# Patient Record
Sex: FEMALE | Race: WHITE | Employment: FULL TIME | URBAN - METROPOLITAN AREA
[De-identification: names, ages, dates, MRNs, and addresses within clinical notes are randomized per-mention and may not be internally consistent; named-entity substitution may affect disease eponyms.]

---

## 2017-01-26 ENCOUNTER — OFFICE VISIT (OUTPATIENT)
Dept: FAMILY MEDICINE CLINIC | Facility: CLINIC | Age: 52
End: 2017-01-26

## 2017-01-26 VITALS
HEIGHT: 64.2 IN | RESPIRATION RATE: 14 BRPM | HEART RATE: 76 BPM | SYSTOLIC BLOOD PRESSURE: 94 MMHG | DIASTOLIC BLOOD PRESSURE: 58 MMHG | BODY MASS INDEX: 21.11 KG/M2 | WEIGHT: 123.63 LBS

## 2017-01-26 DIAGNOSIS — B37.3 CANDIDAL VULVOVAGINITIS: ICD-10-CM

## 2017-01-26 DIAGNOSIS — N91.2 AMENORRHEA: ICD-10-CM

## 2017-01-26 DIAGNOSIS — J01.90 ACUTE NON-RECURRENT SINUSITIS, UNSPECIFIED LOCATION: Primary | ICD-10-CM

## 2017-01-26 PROCEDURE — 99213 OFFICE O/P EST LOW 20 MIN: CPT | Performed by: FAMILY MEDICINE

## 2017-01-26 RX ORDER — FLUCONAZOLE 150 MG/1
150 TABLET ORAL ONCE
Qty: 2 TABLET | Refills: 0 | Status: SHIPPED | OUTPATIENT
Start: 2017-01-26 | End: 2017-01-26

## 2017-01-26 RX ORDER — AMOXICILLIN AND CLAVULANATE POTASSIUM 875; 125 MG/1; MG/1
1 TABLET, FILM COATED ORAL 2 TIMES DAILY
Qty: 20 TABLET | Refills: 0 | Status: SHIPPED | OUTPATIENT
Start: 2017-01-26 | End: 2017-02-05

## 2017-01-26 NOTE — PROGRESS NOTES
Chief Complaint:  Patient presents with:  Eye Problem: 2-3 weeks left eye continues to water  Sinus Problem: sinus pain, only on left side  Headache  Lab: wouold like orders for labs to check for Menopause      HPI:  This is a 54year old female patient pr Prescriptions:  Amoxicillin-Pot Clavulanate 875-125 MG Oral Tab Take 1 tablet by mouth 2 (two) times daily. Disp: 20 tablet Rfl: 0   fluconazole 150 MG Oral Tab Take 1 tablet (150 mg total) by mouth once.  Repeat in 48 hours if symptoms persist. Disp: 2 tab Candidal vulvovaginitis         Relevant Medications     fluconazole 150 MG Oral Tab     Amenorrhea               Advised the following:  Naz Prince was seen today for eye problem, sinus problem, headache and lab.     Diagnoses and all orders for this visit:    A

## 2017-05-09 ENCOUNTER — OFFICE VISIT (OUTPATIENT)
Dept: FAMILY MEDICINE CLINIC | Facility: CLINIC | Age: 52
End: 2017-05-09

## 2017-05-09 VITALS
BODY MASS INDEX: 21.68 KG/M2 | SYSTOLIC BLOOD PRESSURE: 100 MMHG | TEMPERATURE: 99 F | HEART RATE: 68 BPM | WEIGHT: 127 LBS | HEIGHT: 64 IN | DIASTOLIC BLOOD PRESSURE: 60 MMHG | RESPIRATION RATE: 12 BRPM

## 2017-05-09 DIAGNOSIS — Z13.0 SCREENING FOR ENDOCRINE, NUTRITIONAL, METABOLIC AND IMMUNITY DISORDER: ICD-10-CM

## 2017-05-09 DIAGNOSIS — M25.551 RIGHT HIP PAIN: ICD-10-CM

## 2017-05-09 DIAGNOSIS — Z82.49 FAMILY HISTORY OF HEART DISEASE: ICD-10-CM

## 2017-05-09 DIAGNOSIS — F33.41 RECURRENT MAJOR DEPRESSION IN PARTIAL REMISSION (HCC): ICD-10-CM

## 2017-05-09 DIAGNOSIS — Z13.228 SCREENING FOR ENDOCRINE, NUTRITIONAL, METABOLIC AND IMMUNITY DISORDER: ICD-10-CM

## 2017-05-09 DIAGNOSIS — Z13.21 SCREENING FOR ENDOCRINE, NUTRITIONAL, METABOLIC AND IMMUNITY DISORDER: ICD-10-CM

## 2017-05-09 DIAGNOSIS — Z00.00 ROUTINE GENERAL MEDICAL EXAMINATION AT A HEALTH CARE FACILITY: Primary | ICD-10-CM

## 2017-05-09 DIAGNOSIS — Z13.29 SCREENING FOR ENDOCRINE, NUTRITIONAL, METABOLIC AND IMMUNITY DISORDER: ICD-10-CM

## 2017-05-09 DIAGNOSIS — Z13.220 SCREENING, LIPID: ICD-10-CM

## 2017-05-09 DIAGNOSIS — Z13.1 SCREENING FOR DIABETES MELLITUS: ICD-10-CM

## 2017-05-09 PROCEDURE — 90715 TDAP VACCINE 7 YRS/> IM: CPT | Performed by: NURSE PRACTITIONER

## 2017-05-09 PROCEDURE — 90471 IMMUNIZATION ADMIN: CPT | Performed by: NURSE PRACTITIONER

## 2017-05-09 PROCEDURE — 99396 PREV VISIT EST AGE 40-64: CPT | Performed by: NURSE PRACTITIONER

## 2017-05-09 PROCEDURE — 99213 OFFICE O/P EST LOW 20 MIN: CPT | Performed by: NURSE PRACTITIONER

## 2017-05-09 RX ORDER — FLUOXETINE HYDROCHLORIDE 20 MG/1
20 CAPSULE ORAL DAILY
Qty: 90 CAPSULE | Refills: 3 | Status: SHIPPED | OUTPATIENT
Start: 2017-05-09 | End: 2017-05-09 | Stop reason: CLARIF

## 2017-05-09 RX ORDER — FLUOXETINE HYDROCHLORIDE 20 MG/1
20 CAPSULE ORAL DAILY
Qty: 90 CAPSULE | Refills: 3 | Status: SHIPPED | OUTPATIENT
Start: 2017-05-09 | End: 2017-05-09

## 2017-05-09 NOTE — PROGRESS NOTES
HPI:   Tj Barrera is a 54year old female who presents for a complete physical exam. Last PE over 3 years ago, sees gyne regularly for hx of abnormal pap.  She complains of right hip/upper leg pain for the past 3 weeks with hx of pain at same location o Calcium-Vitamin D (CALTRATE 600 PLUS-VIT D OR) Take  by mouth.  1 daily Disp:  Rfl:       Past Medical History   Diagnosis Date   • OTHER DISEASES      CONSTIPATION   • Acute sinusitis, unspecified    • Acute tonsillitis    • Depression    • Herpes zoste supple, no adenopathy, no bruits, no thyromegaly. BREAST: Bilateral breasts are symmetrical, no masses, no nipple discharge and no axillary lymphadenopathy. LUNGS: clear to auscultation in all fields, good air exchange throughout, breathing non labored. fasting (10-12 hours, water only) labs at [facility].  Supplements recommended: Vitamin D 1000 IU daily, Calcium 500 mg twice a day with food if not part of diet. Self breast exam monthly.   Instruct on regular aerobic exercise 5-7 days per week for 30-45 minut

## 2017-05-09 NOTE — PATIENT INSTRUCTIONS
Immunizations reviewed, recommend: flu vaccine this fall season. Tdap booster given today.   Complete fasting (10-12 hours, water only) labs at [facility].  Supplements recommended: Vitamin D 1000 IU daily, Calcium 500 mg twice a day with food if not part of die

## 2017-05-11 ENCOUNTER — HOSPITAL ENCOUNTER (OUTPATIENT)
Dept: GENERAL RADIOLOGY | Facility: HOSPITAL | Age: 52
Discharge: HOME | End: 2017-05-11
Attending: NURSE PRACTITIONER
Payer: COMMERCIAL

## 2017-05-11 DIAGNOSIS — M25.551 RIGHT HIP PAIN: ICD-10-CM

## 2017-05-11 PROCEDURE — 73502 X-RAY EXAM HIP UNI 2-3 VIEWS: CPT | Performed by: NURSE PRACTITIONER

## 2017-05-12 ENCOUNTER — PRIOR ORIGINAL RECORDS (OUTPATIENT)
Dept: OTHER | Age: 52
End: 2017-05-12

## 2017-05-15 ENCOUNTER — TELEPHONE (OUTPATIENT)
Dept: FAMILY MEDICINE CLINIC | Facility: CLINIC | Age: 52
End: 2017-05-15

## 2017-05-15 DIAGNOSIS — S76.011A HIP STRAIN, RIGHT, INITIAL ENCOUNTER: ICD-10-CM

## 2017-05-15 DIAGNOSIS — M25.551 RIGHT HIP PAIN: Primary | ICD-10-CM

## 2017-05-15 NOTE — TELEPHONE ENCOUNTER
----- Message from BALDEMAR Tim sent at 5/12/2017  3:26 PM CDT -----  Please notify patient of normal xray of right hip. Place PT order for evaluation and treatment dx: right hip pain, strain.

## 2018-01-09 ENCOUNTER — OFFICE VISIT (OUTPATIENT)
Dept: FAMILY MEDICINE CLINIC | Facility: CLINIC | Age: 53
End: 2018-01-09

## 2018-01-09 VITALS
HEART RATE: 80 BPM | SYSTOLIC BLOOD PRESSURE: 106 MMHG | DIASTOLIC BLOOD PRESSURE: 64 MMHG | RESPIRATION RATE: 16 BRPM | BODY MASS INDEX: 22.53 KG/M2 | TEMPERATURE: 98 F | WEIGHT: 132 LBS | HEIGHT: 64 IN

## 2018-01-09 DIAGNOSIS — E87.1 HYPONATREMIA: ICD-10-CM

## 2018-01-09 DIAGNOSIS — G44.229 CHRONIC TENSION-TYPE HEADACHE, NOT INTRACTABLE: Primary | ICD-10-CM

## 2018-01-09 DIAGNOSIS — R53.82 CHRONIC FATIGUE: ICD-10-CM

## 2018-01-09 DIAGNOSIS — F33.41 RECURRENT MAJOR DEPRESSION IN PARTIAL REMISSION (HCC): ICD-10-CM

## 2018-01-09 DIAGNOSIS — R07.89 ATYPICAL CHEST PAIN: ICD-10-CM

## 2018-01-09 DIAGNOSIS — M62.838 MUSCLE SPASM OF LEFT SHOULDER: ICD-10-CM

## 2018-01-09 DIAGNOSIS — M99.01 CERVICAL SOMATIC DYSFUNCTION: ICD-10-CM

## 2018-01-09 DIAGNOSIS — E89.0 POSTABLATIVE HYPOTHYROIDISM: ICD-10-CM

## 2018-01-09 DIAGNOSIS — M99.02 SOMATIC DYSFUNCTION OF THORACIC REGION: ICD-10-CM

## 2018-01-09 PROCEDURE — 99214 OFFICE O/P EST MOD 30 MIN: CPT | Performed by: FAMILY MEDICINE

## 2018-01-09 RX ORDER — FLUOXETINE HYDROCHLORIDE 20 MG/1
20 CAPSULE ORAL DAILY
COMMUNITY
End: 2018-02-13

## 2018-01-09 RX ORDER — CYCLOBENZAPRINE HCL 5 MG
5 TABLET ORAL 3 TIMES DAILY PRN
Qty: 30 TABLET | Refills: 0 | Status: SHIPPED | OUTPATIENT
Start: 2018-01-09 | End: 2019-05-07 | Stop reason: ALTCHOICE

## 2018-01-09 RX ORDER — METRONIDAZOLE 500 MG/1
1 TABLET ORAL 2 TIMES DAILY
COMMUNITY
Start: 2017-12-27 | End: 2018-02-13 | Stop reason: ALTCHOICE

## 2018-01-09 NOTE — PROGRESS NOTES
Chief Complaint:  Patient presents with:  Neck Pain: L neck and shoulder pain present for a while. Taking advill,using warm compresses and getting massages without relief. Pain is leading to HA  Lightheadedness: Occurring sometimes with exercise.  Winded e all  PHQ-9 TOTAL SCORE: 7  If you checked off any problems, how difficult have these problems made it for you to do your work, take care of things at home, or get along with other people?: Not difficult at all      When exercising feels SOB and twinge in h Prescriptions:  FLUoxetine HCl 20 MG Oral Cap Take 20 mg by mouth daily. Disp:  Rfl:    Cyclobenzaprine HCl 5 MG Oral Tab Take 1 tablet (5 mg total) by mouth 3 (three) times daily as needed for Muscle spasms.  Disp: 30 tablet Rfl: 0   Probiotic Product (PRO FLUoxetine HCl 20 MG Oral Cap      Other Visit Diagnoses     Chronic tension-type headache, not intractable    -  Primary    Relevant Orders    CHIROPRACTIC  - INTERNAL    Muscle spasm of left shoulder        Relevant Medications    Cyclobenzaprine HCl increase prozac to 40mg. Recheck in 3-4 weeks.   Dominick Braswell DO  1/9/2018 1:44 PM  Family Medicine

## 2018-01-16 ENCOUNTER — TELEPHONE (OUTPATIENT)
Dept: FAMILY MEDICINE CLINIC | Facility: CLINIC | Age: 53
End: 2018-01-16

## 2018-01-16 ENCOUNTER — HOSPITAL ENCOUNTER (OUTPATIENT)
Dept: LAB | Facility: HOSPITAL | Age: 53
Discharge: HOME | End: 2018-01-16
Attending: FAMILY MEDICINE
Payer: COMMERCIAL

## 2018-01-16 ENCOUNTER — APPOINTMENT (OUTPATIENT)
Dept: CV DIAGNOSTICS | Facility: HOSPITAL | Age: 53
End: 2018-01-16
Attending: EMERGENCY MEDICINE
Payer: COMMERCIAL

## 2018-01-16 ENCOUNTER — HOSPITAL ENCOUNTER (EMERGENCY)
Facility: HOSPITAL | Age: 53
Discharge: HOME | End: 2018-01-16
Attending: EMERGENCY MEDICINE
Payer: COMMERCIAL

## 2018-01-16 ENCOUNTER — PRIOR ORIGINAL RECORDS (OUTPATIENT)
Dept: OTHER | Age: 53
End: 2018-01-16

## 2018-01-16 ENCOUNTER — APPOINTMENT (OUTPATIENT)
Dept: GENERAL RADIOLOGY | Facility: HOSPITAL | Age: 53
End: 2018-01-16
Payer: COMMERCIAL

## 2018-01-16 ENCOUNTER — HOSPITAL ENCOUNTER (OUTPATIENT)
Dept: CV DIAGNOSTICS | Facility: HOSPITAL | Age: 53
Discharge: HOME | End: 2018-01-16
Attending: FAMILY MEDICINE
Payer: COMMERCIAL

## 2018-01-16 VITALS
HEIGHT: 64 IN | RESPIRATION RATE: 12 BRPM | OXYGEN SATURATION: 96 % | SYSTOLIC BLOOD PRESSURE: 101 MMHG | WEIGHT: 130 LBS | BODY MASS INDEX: 22.2 KG/M2 | HEART RATE: 79 BPM | DIASTOLIC BLOOD PRESSURE: 63 MMHG | TEMPERATURE: 98 F

## 2018-01-16 DIAGNOSIS — R53.82 CHRONIC FATIGUE: ICD-10-CM

## 2018-01-16 DIAGNOSIS — E87.1 HYPONATREMIA: ICD-10-CM

## 2018-01-16 DIAGNOSIS — E89.0 POSTABLATIVE HYPOTHYROIDISM: ICD-10-CM

## 2018-01-16 DIAGNOSIS — R07.89 ATYPICAL CHEST PAIN: ICD-10-CM

## 2018-01-16 DIAGNOSIS — R94.31 ABNORMAL EKG: Primary | ICD-10-CM

## 2018-01-16 DIAGNOSIS — R07.9 ACUTE CHEST PAIN: ICD-10-CM

## 2018-01-16 LAB
ALBUMIN SERPL-MCNC: 3.7 G/DL (ref 3.5–4.8)
ALP LIVER SERPL-CCNC: 46 U/L (ref 39–100)
ALT SERPL-CCNC: 19 U/L (ref 14–54)
AST SERPL-CCNC: 16 U/L (ref 15–41)
BASOPHILS # BLD AUTO: 0.04 X10(3) UL (ref 0–0.1)
BASOPHILS NFR BLD AUTO: 0.7 %
BILIRUB SERPL-MCNC: 0.3 MG/DL (ref 0.1–2)
BUN BLD-MCNC: 10 MG/DL (ref 8–20)
BUN BLD-MCNC: 10 MG/DL (ref 8–20)
CALCIUM BLD-MCNC: 9.1 MG/DL (ref 8.3–10.3)
CALCIUM BLD-MCNC: 9.3 MG/DL (ref 8.3–10.3)
CHLORIDE: 101 MMOL/L (ref 101–111)
CHLORIDE: 103 MMOL/L (ref 101–111)
CO2: 24 MMOL/L (ref 22–32)
CO2: 26 MMOL/L (ref 22–32)
CREAT BLD-MCNC: 0.87 MG/DL (ref 0.55–1.02)
CREAT BLD-MCNC: 0.96 MG/DL (ref 0.55–1.02)
EOSINOPHIL # BLD AUTO: 0.02 X10(3) UL (ref 0–0.3)
EOSINOPHIL NFR BLD AUTO: 0.4 %
ERYTHROCYTE [DISTWIDTH] IN BLOOD BY AUTOMATED COUNT: 12 % (ref 11.5–16)
ERYTHROCYTE [DISTWIDTH] IN BLOOD BY AUTOMATED COUNT: 12 % (ref 11.5–16)
GLUCOSE BLD-MCNC: 121 MG/DL (ref 70–99)
GLUCOSE BLD-MCNC: 95 MG/DL (ref 70–99)
HCT VFR BLD AUTO: 38.2 % (ref 34–50)
HCT VFR BLD AUTO: 39.2 % (ref 34–50)
HGB BLD-MCNC: 13.1 G/DL (ref 12–16)
HGB BLD-MCNC: 13.4 G/DL (ref 12–16)
IMMATURE GRANULOCYTE COUNT: 0.02 X10(3) UL (ref 0–1)
IMMATURE GRANULOCYTE RATIO %: 0.4 %
LYMPHOCYTES # BLD AUTO: 1.55 X10(3) UL (ref 0.9–4)
LYMPHOCYTES NFR BLD AUTO: 27.6 %
M PROTEIN MFR SERPL ELPH: 8 G/DL (ref 6.1–8.3)
MCH RBC QN AUTO: 33 PG (ref 27–33.2)
MCH RBC QN AUTO: 33.7 PG (ref 27–33.2)
MCHC RBC AUTO-ENTMCNC: 34.2 G/DL (ref 31–37)
MCHC RBC AUTO-ENTMCNC: 34.3 G/DL (ref 31–37)
MCV RBC AUTO: 96.2 FL (ref 81–100)
MCV RBC AUTO: 98.5 FL (ref 81–100)
MONOCYTES # BLD AUTO: 0.35 X10(3) UL (ref 0.1–0.6)
MONOCYTES NFR BLD AUTO: 6.2 %
NEUTROPHIL ABS PRELIM: 3.64 X10 (3) UL (ref 1.3–6.7)
NEUTROPHILS # BLD AUTO: 3.64 X10(3) UL (ref 1.3–6.7)
NEUTROPHILS NFR BLD AUTO: 64.7 %
PLATELET # BLD AUTO: 285 10(3)UL (ref 150–450)
PLATELET # BLD AUTO: 291 10(3)UL (ref 150–450)
POTASSIUM SERPL-SCNC: 3.7 MMOL/L (ref 3.6–5.1)
POTASSIUM SERPL-SCNC: 4.4 MMOL/L (ref 3.6–5.1)
RBC # BLD AUTO: 3.97 X10(6)UL (ref 3.8–5.1)
RBC # BLD AUTO: 3.98 X10(6)UL (ref 3.8–5.1)
RED CELL DISTRIBUTION WIDTH-SD: 42.7 FL (ref 35.1–46.3)
RED CELL DISTRIBUTION WIDTH-SD: 43.9 FL (ref 35.1–46.3)
SODIUM SERPL-SCNC: 136 MMOL/L (ref 136–144)
SODIUM SERPL-SCNC: 137 MMOL/L (ref 136–144)
TROPONIN: <0.046 NG/ML (ref ?–0.05)
TROPONIN: <0.046 NG/ML (ref ?–0.05)
TSI SER-ACNC: 2.8 MIU/ML (ref 0.35–5.5)
WBC # BLD AUTO: 5.6 X10(3) UL (ref 4–13)
WBC # BLD AUTO: 6.1 X10(3) UL (ref 4–13)

## 2018-01-16 PROCEDURE — 99285 EMERGENCY DEPT VISIT HI MDM: CPT

## 2018-01-16 PROCEDURE — 80053 COMPREHEN METABOLIC PANEL: CPT

## 2018-01-16 PROCEDURE — 93350 STRESS TTE ONLY: CPT | Performed by: EMERGENCY MEDICINE

## 2018-01-16 PROCEDURE — 93005 ELECTROCARDIOGRAM TRACING: CPT

## 2018-01-16 PROCEDURE — 36415 COLL VENOUS BLD VENIPUNCTURE: CPT

## 2018-01-16 PROCEDURE — 85027 COMPLETE CBC AUTOMATED: CPT

## 2018-01-16 PROCEDURE — 80050 GENERAL HEALTH PANEL: CPT

## 2018-01-16 PROCEDURE — 85025 COMPLETE CBC W/AUTO DIFF WBC: CPT | Performed by: EMERGENCY MEDICINE

## 2018-01-16 PROCEDURE — 84484 ASSAY OF TROPONIN QUANT: CPT | Performed by: EMERGENCY MEDICINE

## 2018-01-16 PROCEDURE — 71045 X-RAY EXAM CHEST 1 VIEW: CPT | Performed by: EMERGENCY MEDICINE

## 2018-01-16 PROCEDURE — 80048 BASIC METABOLIC PNL TOTAL CA: CPT

## 2018-01-16 PROCEDURE — 84443 ASSAY THYROID STIM HORMONE: CPT

## 2018-01-16 PROCEDURE — 93017 CV STRESS TEST TRACING ONLY: CPT | Performed by: EMERGENCY MEDICINE

## 2018-01-16 PROCEDURE — 93018 CV STRESS TEST I&R ONLY: CPT | Performed by: EMERGENCY MEDICINE

## 2018-01-16 PROCEDURE — 93010 ELECTROCARDIOGRAM REPORT: CPT

## 2018-01-16 RX ORDER — ASPIRIN 81 MG/1
324 TABLET, CHEWABLE ORAL ONCE
Status: COMPLETED | OUTPATIENT
Start: 2018-01-16 | End: 2018-01-16

## 2018-01-16 NOTE — ED PROVIDER NOTES
Patient Seen in: [facility] Emergency Department    History   Patient presents with:  Chest Pain Angina (cardiovascular)    Stated Complaint: chest pain    HPI    53-year-old female, family history of CAD but no other risk factors, sent from a cardi 59 kg   SpO2 96%   BMI 22.31 kg/m²         Physical Exam      Constitutional: Pt is oriented to person, place, and time. Appears well-developed and well-nourished. Head: Normocephalic and atraumatic.    Nose: Nose normal.   Eyes: EOM are normal. Pupils ar Daniele 16 [id number]  ------------------------------------------------------------       MDM         Rhythm strip from cardiographic's reviewed. Patient has marketed ST depressions with T-wave inversions in the inferior and lateral precordial leads.      Troponin n

## 2018-01-16 NOTE — TELEPHONE ENCOUNTER
Demetra Goodpasture with Cardiac [facility] with [facility] calling to state that as soon as patient was hooked up to EKG everything was abnormal, should patient be sent to ER?

## 2018-01-16 NOTE — ED INITIAL ASSESSMENT (HPI)
Pt was at [address] [place] [address] today and had T wave abnormality and developed CP and was sent to ER for further eval. Pt states CP comes and goes. Pt also c/o sob with exertion.

## 2018-01-16 NOTE — PROGRESS NOTES
[facility] preliminary ER stress echo:  Patient walked 9 minutes achieving 105% heartrate and denied any change in epigastric discomfort throughout test.  Dr. Monica Brar paged to read the test and patient taken back to ER per w/c by echo tech and r

## 2018-01-18 ENCOUNTER — TELEPHONE (OUTPATIENT)
Dept: FAMILY MEDICINE CLINIC | Facility: CLINIC | Age: 53
End: 2018-01-18

## 2018-01-18 LAB
ATRIAL RATE: 75 BPM
ATRIAL RATE: 81 BPM
P AXIS: 68 DEGREES
P AXIS: 81 DEGREES
P-R INTERVAL: 110 MS
P-R INTERVAL: 92 MS
Q-T INTERVAL: 364 MS
Q-T INTERVAL: 390 MS
QRS DURATION: 48 MS
QRS DURATION: 76 MS
QTC CALCULATION (BEZET): 422 MS
QTC CALCULATION (BEZET): 435 MS
R AXIS: 79 DEGREES
R AXIS: 80 DEGREES
T AXIS: 34 DEGREES
T AXIS: 86 DEGREES
VENTRICULAR RATE: 75 BPM
VENTRICULAR RATE: 81 BPM

## 2018-01-18 NOTE — TELEPHONE ENCOUNTER
Pt was seen in the ER on 1/16/18 for an abnormal EKG, pt states she is having mild discomfort but she is doing ok, she also stated that she has an follow up appointment with her Cardiologist next week.

## 2018-01-24 ENCOUNTER — PRIOR ORIGINAL RECORDS (OUTPATIENT)
Dept: OTHER | Age: 53
End: 2018-01-24

## 2018-01-24 ENCOUNTER — MYAURORA ACCOUNT LINK (OUTPATIENT)
Dept: OTHER | Age: 53
End: 2018-01-24

## 2018-01-25 LAB
ALBUMIN: 3.7 G/DL
ALKALINE PHOSPHATATE(ALK PHOS): 46 IU/L
BILIRUBIN TOTAL: 0.3 MG/DL
BUN: 10 MG/DL
CALCIUM: 9.1 MG/DL
CHLORIDE: 101 MEQ/L
CHOLESTEROL, TOTAL: 199 MG/DL
CREATININE, SERUM: 0.96 MG/DL
GLUCOSE: 121 MG/DL
HDL CHOLESTEROL: 100 MG/DL
HEMATOCRIT: 38.2 %
HEMOGLOBIN: 13.1 G/DL
LDL CHOLESTEROL: 80 MG/DL
PLATELETS: 291 K/UL
POTASSIUM, SERUM: 3.7 MEQ/L
PROTEIN, TOTAL: 8 G/DL
RED BLOOD COUNT: 3.97 X 10-6/U
SGOT (AST): 16 IU/L
SGPT (ALT): 19 IU/L
SODIUM: 136 MEQ/L
THYROID STIMULATING HORMONE: 2.8 MLU/L
TRIGLYCERIDES: 94 MG/DL
WHITE BLOOD COUNT: 5.6 X 10-3/U

## 2018-01-31 ENCOUNTER — HOSPITAL ENCOUNTER (OUTPATIENT)
Dept: CT IMAGING | Facility: HOSPITAL | Age: 53
Discharge: HOME | End: 2018-01-31
Attending: INTERNAL MEDICINE
Payer: COMMERCIAL

## 2018-01-31 ENCOUNTER — LAB ENCOUNTER (OUTPATIENT)
Dept: LAB | Facility: HOSPITAL | Age: 53
End: 2018-01-31
Attending: INTERNAL MEDICINE
Payer: COMMERCIAL

## 2018-01-31 ENCOUNTER — PRIOR ORIGINAL RECORDS (OUTPATIENT)
Dept: OTHER | Age: 53
End: 2018-01-31

## 2018-01-31 DIAGNOSIS — R94.39 ABNORMAL STRESS TEST: ICD-10-CM

## 2018-01-31 DIAGNOSIS — R07.9 CHEST PAIN: Primary | ICD-10-CM

## 2018-01-31 DIAGNOSIS — R07.9 CHEST PAIN: ICD-10-CM

## 2018-01-31 LAB
CHOLEST SMN-MCNC: 227 MG/DL (ref ?–200)
HDLC SERPL-MCNC: 103 MG/DL (ref 45–?)
HDLC SERPL: 2.2 {RATIO} (ref ?–4.44)
LDLC SERPL CALC-MCNC: 105 MG/DL (ref ?–130)
NONHDLC SERPL-MCNC: 124 MG/DL (ref ?–130)
TRIGL SERPL-MCNC: 96 MG/DL (ref ?–150)
VLDLC SERPL CALC-MCNC: 19 MG/DL (ref 5–40)

## 2018-01-31 PROCEDURE — 75574 CT ANGIO HRT W/3D IMAGE: CPT | Performed by: INTERNAL MEDICINE

## 2018-01-31 PROCEDURE — 36415 COLL VENOUS BLD VENIPUNCTURE: CPT

## 2018-01-31 PROCEDURE — 80061 LIPID PANEL: CPT

## 2018-01-31 RX ORDER — NITROGLYCERIN 0.4 MG/1
TABLET SUBLINGUAL
Status: COMPLETED
Start: 2018-01-31 | End: 2018-01-31

## 2018-01-31 RX ADMIN — NITROGLYCERIN 0.4 MG: 0.4 TABLET SUBLINGUAL at 08:38:00

## 2018-02-05 ENCOUNTER — PRIOR ORIGINAL RECORDS (OUTPATIENT)
Dept: OTHER | Age: 53
End: 2018-02-05

## 2018-02-05 LAB — UFCT: 0 CA SCORE

## 2018-02-06 LAB
CHOLESTEROL, TOTAL: 227 MG/DL
HDL CHOLESTEROL: 103 MG/DL
LDL CHOLESTEROL: 105 MG/DL
TRIGLYCERIDES: 96 MG/DL

## 2018-02-13 ENCOUNTER — OFFICE VISIT (OUTPATIENT)
Dept: FAMILY MEDICINE CLINIC | Facility: CLINIC | Age: 53
End: 2018-02-13

## 2018-02-13 VITALS
WEIGHT: 132 LBS | SYSTOLIC BLOOD PRESSURE: 100 MMHG | TEMPERATURE: 98 F | RESPIRATION RATE: 14 BRPM | DIASTOLIC BLOOD PRESSURE: 72 MMHG | HEART RATE: 78 BPM | BODY MASS INDEX: 21.21 KG/M2 | HEIGHT: 66 IN

## 2018-02-13 DIAGNOSIS — R07.89 ATYPICAL CHEST PAIN: ICD-10-CM

## 2018-02-13 DIAGNOSIS — F33.41 RECURRENT MAJOR DEPRESSION IN PARTIAL REMISSION (HCC): ICD-10-CM

## 2018-02-13 DIAGNOSIS — G44.211 INTRACTABLE EPISODIC TENSION-TYPE HEADACHE: Primary | ICD-10-CM

## 2018-02-13 PROCEDURE — 99214 OFFICE O/P EST MOD 30 MIN: CPT | Performed by: FAMILY MEDICINE

## 2018-02-13 RX ORDER — VALACYCLOVIR HYDROCHLORIDE 1 G/1
TABLET, FILM COATED ORAL
COMMUNITY
Start: 2018-01-18 | End: 2018-02-13

## 2018-02-13 RX ORDER — FLUOXETINE HYDROCHLORIDE 20 MG/1
20 CAPSULE ORAL 2 TIMES DAILY
Qty: 120 CAPSULE | Refills: 2 | Status: SHIPPED | OUTPATIENT
Start: 2018-02-13 | End: 2018-06-24

## 2018-02-13 NOTE — PROGRESS NOTES
Chief Complaint:  Patient presents with:  Headache: 1 month f/u Pt states things are better.      HPI:  This is a 50year old female patient presenting for Headache (1 month f/u Pt states things are better. )    Tension headaches:  Headaches are getting bet Smoker                                                              Smokeless tobacco: Never Used                      Alcohol use: Yes           4.2 oz/week     Standard drinks or equivalent: 7 per week     Comment: 1 glass of wine daily        Current Ou Relevant Medications    FLUoxetine HCl 20 MG Oral Cap      Other Visit Diagnoses     Atypical chest pain              Advised the following:  Naz Prince was seen today for headache.     Diagnoses and all orders for this visit:    Intractable episodic tension-type

## 2018-06-24 DIAGNOSIS — F33.41 RECURRENT MAJOR DEPRESSION IN PARTIAL REMISSION (HCC): ICD-10-CM

## 2018-06-25 NOTE — TELEPHONE ENCOUNTER
Refill request sent from pharmacy for Fluoxetine. LOV 02/131/8. No future appt scheduled yet. Will you approve refill? Routed to Dr Vitaly Pappas.

## 2018-06-26 RX ORDER — FLUOXETINE HYDROCHLORIDE 20 MG/1
CAPSULE ORAL
Qty: 90 CAPSULE | Refills: 1 | Status: SHIPPED | OUTPATIENT
Start: 2018-06-26 | End: 2018-12-23

## 2018-11-14 ENCOUNTER — OFFICE VISIT (OUTPATIENT)
Dept: FAMILY MEDICINE CLINIC | Facility: CLINIC | Age: 53
End: 2018-11-14
Payer: COMMERCIAL

## 2018-11-14 ENCOUNTER — TELEPHONE (OUTPATIENT)
Dept: FAMILY MEDICINE CLINIC | Facility: CLINIC | Age: 53
End: 2018-11-14

## 2018-11-14 VITALS
RESPIRATION RATE: 16 BRPM | TEMPERATURE: 97 F | HEART RATE: 82 BPM | HEIGHT: 65.5 IN | DIASTOLIC BLOOD PRESSURE: 58 MMHG | WEIGHT: 135.81 LBS | BODY MASS INDEX: 22.35 KG/M2 | SYSTOLIC BLOOD PRESSURE: 100 MMHG

## 2018-11-14 DIAGNOSIS — N39.0 URINARY TRACT INFECTION WITH HEMATURIA, SITE UNSPECIFIED: ICD-10-CM

## 2018-11-14 DIAGNOSIS — R31.9 URINARY TRACT INFECTION WITH HEMATURIA, SITE UNSPECIFIED: ICD-10-CM

## 2018-11-14 DIAGNOSIS — N30.01 ACUTE CYSTITIS WITH HEMATURIA: Primary | ICD-10-CM

## 2018-11-14 PROCEDURE — 81003 URINALYSIS AUTO W/O SCOPE: CPT | Performed by: FAMILY MEDICINE

## 2018-11-14 PROCEDURE — 99213 OFFICE O/P EST LOW 20 MIN: CPT | Performed by: FAMILY MEDICINE

## 2018-11-14 RX ORDER — CIPROFLOXACIN 250 MG/1
250 TABLET, FILM COATED ORAL 2 TIMES DAILY
Qty: 14 TABLET | Refills: 0 | Status: SHIPPED | OUTPATIENT
Start: 2018-11-14 | End: 2018-11-24

## 2018-11-14 NOTE — TELEPHONE ENCOUNTER
[facility] pharmacy is calling to clarify instructions for Ciprofloxacin HCl 250 MG Oral . Quantity does not match dosage per pharmacy

## 2018-11-14 NOTE — PROGRESS NOTES
Jarad Hurt is a 48year old female.     S:  Patient presents today with the following concerns:  UTI (noticed discomfort on Sunday but last night awoke at 3 am had to got having urgency and frequency with blood in urine )    Constipated over the weekend SYSTEMS:  GENERAL: feels well otherwise  SKIN: denies any unusual skin lesions  EYES:denies vision change  LUNGS: denies shortness of breath with exertion  CARDIOVASCULAR: denies chest pain on exertion  GI: denies abdominal pain.   No N/V/D/C  : see above

## 2018-12-23 DIAGNOSIS — F33.41 RECURRENT MAJOR DEPRESSION IN PARTIAL REMISSION (HCC): ICD-10-CM

## 2018-12-26 RX ORDER — FLUOXETINE HYDROCHLORIDE 20 MG/1
CAPSULE ORAL
Qty: 90 CAPSULE | Refills: 0 | Status: SHIPPED | OUTPATIENT
Start: 2018-12-26 | End: 2019-01-02

## 2018-12-26 NOTE — TELEPHONE ENCOUNTER
Pt has scheduled an appt for med refill/Depression on 1/2/19 w/Sanya Paetl. Med should go to her [facility] but she will be out. [facility]?

## 2018-12-26 NOTE — TELEPHONE ENCOUNTER
LOV 11/14/2018 was acute. Last appt for depression was 2/13/18 and at that time, pt was advised to follow up in 6 months. No future appointments.      Refill request for:    Requested Prescriptions     Pending Prescriptions Disp Refills   • FLUOXETINE HCL

## 2018-12-27 NOTE — TELEPHONE ENCOUNTER
Name from pharmacy: FLUOXETINE HCL CAPS 20MG          Will file in chart as: FLUOXETINE HCL 20 MG Oral Cap    Sig: TAKE 1 CAPSULE DAILY    Disp:  90 capsule    Refills:  1    Start: 12/23/2018    Class: Normal    For: Recurrent major depression in partial

## 2018-12-27 NOTE — TELEPHONE ENCOUNTER
Pt has scheduled an appt for med refill/Depression on 1/2/19 Debra   Out of med, requesting refill to local pharm [facility]

## 2019-01-02 ENCOUNTER — OFFICE VISIT (OUTPATIENT)
Dept: FAMILY MEDICINE CLINIC | Facility: CLINIC | Age: 54
End: 2019-01-02
Payer: COMMERCIAL

## 2019-01-02 VITALS
DIASTOLIC BLOOD PRESSURE: 60 MMHG | WEIGHT: 134 LBS | TEMPERATURE: 99 F | BODY MASS INDEX: 22.88 KG/M2 | HEART RATE: 70 BPM | RESPIRATION RATE: 16 BRPM | SYSTOLIC BLOOD PRESSURE: 110 MMHG | HEIGHT: 64 IN

## 2019-01-02 DIAGNOSIS — F33.41 RECURRENT MAJOR DEPRESSION IN PARTIAL REMISSION (HCC): ICD-10-CM

## 2019-01-02 DIAGNOSIS — Z23 NEED FOR INFLUENZA VACCINATION: Primary | ICD-10-CM

## 2019-01-02 PROCEDURE — 99213 OFFICE O/P EST LOW 20 MIN: CPT | Performed by: PHYSICIAN ASSISTANT

## 2019-01-02 RX ORDER — FLUOXETINE HYDROCHLORIDE 20 MG/1
20 CAPSULE ORAL
Qty: 90 CAPSULE | Refills: 1 | Status: SHIPPED | OUTPATIENT
Start: 2019-01-02 | End: 2019-07-16

## 2019-01-02 NOTE — PROGRESS NOTES
CC: Depression follow up     [facility]  Yancy Gibbons is a 48year old female who presents for depression follow up. She has been on fluoxetine for a number of years and has toyed with the dose depending on how well she is doing.   She no Subclinical hyperthyroidism     Vaginal high risk human papillomavirus (HPV) DNA test positive      Past Surgical History:   Procedure Laterality Date   •   00   •   02   • Colonoscopy  2015    internal hemorrhoids   • Foot/

## 2019-02-28 VITALS
WEIGHT: 131 LBS | HEART RATE: 84 BPM | BODY MASS INDEX: 22.36 KG/M2 | DIASTOLIC BLOOD PRESSURE: 60 MMHG | HEIGHT: 64 IN | SYSTOLIC BLOOD PRESSURE: 104 MMHG

## 2019-05-07 ENCOUNTER — OFFICE VISIT (OUTPATIENT)
Dept: FAMILY MEDICINE CLINIC | Facility: CLINIC | Age: 54
End: 2019-05-07
Payer: COMMERCIAL

## 2019-05-07 VITALS
RESPIRATION RATE: 16 BRPM | TEMPERATURE: 98 F | HEIGHT: 64 IN | WEIGHT: 138 LBS | SYSTOLIC BLOOD PRESSURE: 116 MMHG | BODY MASS INDEX: 23.56 KG/M2 | HEART RATE: 76 BPM | DIASTOLIC BLOOD PRESSURE: 68 MMHG

## 2019-05-07 DIAGNOSIS — R35.0 URINE FREQUENCY: ICD-10-CM

## 2019-05-07 DIAGNOSIS — N30.01 ACUTE CYSTITIS WITH HEMATURIA: Primary | ICD-10-CM

## 2019-05-07 DIAGNOSIS — R53.82 CHRONIC FATIGUE: ICD-10-CM

## 2019-05-07 DIAGNOSIS — F33.41 RECURRENT MAJOR DEPRESSION IN PARTIAL REMISSION (HCC): ICD-10-CM

## 2019-05-07 PROCEDURE — 99214 OFFICE O/P EST MOD 30 MIN: CPT | Performed by: FAMILY MEDICINE

## 2019-05-07 PROCEDURE — 81003 URINALYSIS AUTO W/O SCOPE: CPT | Performed by: FAMILY MEDICINE

## 2019-05-07 RX ORDER — CIPROFLOXACIN 500 MG/1
500 TABLET, FILM COATED ORAL 2 TIMES DAILY
Qty: 10 TABLET | Refills: 0 | Status: SHIPPED | OUTPATIENT
Start: 2019-05-07 | End: 2019-07-16 | Stop reason: ALTCHOICE

## 2019-05-07 NOTE — PROGRESS NOTES
Chief Complaint:  Patient presents with:  Urinary Frequency: Started this morning     HPI:  This is a 48year old female patient presenting for Urinary Frequency (Started this morning )    Woke up this morning with bladder pressure and pain.  Has had urine stents, angioplasty, dx age 38   • High Cholesterol Father       Social History    Tobacco Use      Smoking status: Never Smoker      Smokeless tobacco: Never Used    Alcohol use:  Yes      Alcohol/week: 4.2 oz      Types: 7 Standard drinks or equivalent URINALYSIS, AUTO, W/O SCOPE    Collection Time: 05/07/19  2:32 PM   Result Value Ref Range    Glucose Urine Negative mg/dL    Bilirubin Negative Negative    Ketones, UA Negative Negative mg/dL    Spec Gravity 1.015 1.005 - 1.030    Blood Urine Small Nega 25-HYDROXY    Will notify of lab results. Consider increasing fluoxetine. Concerning signs and symptoms that warrant returning to the clinic reviewed and patient demonstrated understanding.   Haritha Walker DO  5/7/2019 2:33 PM  Family Medicine

## 2019-05-16 DIAGNOSIS — E87.1 HYPONATREMIA: Primary | ICD-10-CM

## 2019-05-22 ENCOUNTER — PATIENT MESSAGE (OUTPATIENT)
Dept: FAMILY MEDICINE CLINIC | Facility: CLINIC | Age: 54
End: 2019-05-22

## 2019-05-22 DIAGNOSIS — D64.9 NORMOCYTIC ANEMIA: Primary | ICD-10-CM

## 2019-05-22 NOTE — TELEPHONE ENCOUNTER
From: Robel Alaniz  To: Juan Morton DO  Sent: 5/22/2019 2:10 PM CDT  Subject: Test Results Question    Hi, I will just monitor at this time. I don't want to increase the fluoxetine just yet.   Regarding the iron and sodium results, do I need to start

## 2019-06-30 DIAGNOSIS — F33.41 RECURRENT MAJOR DEPRESSION IN PARTIAL REMISSION (HCC): ICD-10-CM

## 2019-07-01 RX ORDER — FLUOXETINE HYDROCHLORIDE 20 MG/1
CAPSULE ORAL
Qty: 90 CAPSULE | Refills: 1 | OUTPATIENT
Start: 2019-07-01

## 2019-07-01 NOTE — TELEPHONE ENCOUNTER
LOV 5/7/2019. Pt was last seen on 1/12/19 for depression and was told to follow up in 3-6 months.        Appointment scheduled: Visit date not found     Refill request for:    Requested Prescriptions     Pending Prescriptions Disp Refills   • FLUOXETINE HCL

## 2019-07-01 NOTE — TELEPHONE ENCOUNTER
Spoke with patient she scheduled her physical for 7/16/19 with Dr. Werner Wheeler. She has enough medication to get to her appt.

## 2019-07-16 ENCOUNTER — OFFICE VISIT (OUTPATIENT)
Dept: FAMILY MEDICINE CLINIC | Facility: CLINIC | Age: 54
End: 2019-07-16
Payer: COMMERCIAL

## 2019-07-16 VITALS
WEIGHT: 136 LBS | HEART RATE: 80 BPM | TEMPERATURE: 98 F | BODY MASS INDEX: 23.22 KG/M2 | RESPIRATION RATE: 16 BRPM | SYSTOLIC BLOOD PRESSURE: 110 MMHG | HEIGHT: 64 IN | DIASTOLIC BLOOD PRESSURE: 68 MMHG

## 2019-07-16 DIAGNOSIS — Z00.00 WELL WOMAN EXAM WITHOUT GYNECOLOGICAL EXAM: Primary | ICD-10-CM

## 2019-07-16 DIAGNOSIS — F33.41 RECURRENT MAJOR DEPRESSION IN PARTIAL REMISSION (HCC): ICD-10-CM

## 2019-07-16 DIAGNOSIS — L29.9 PRURITUS AND RELATED CONDITIONS: ICD-10-CM

## 2019-07-16 PROCEDURE — 99396 PREV VISIT EST AGE 40-64: CPT | Performed by: FAMILY MEDICINE

## 2019-07-16 RX ORDER — FLUOXETINE HYDROCHLORIDE 20 MG/1
20 CAPSULE ORAL
Qty: 90 CAPSULE | Refills: 1 | Status: SHIPPED | OUTPATIENT
Start: 2019-07-16 | End: 2020-01-13

## 2019-07-16 NOTE — PROGRESS NOTES
SUBJECTIVE:  Patient presents with:  Physical: WWE no pap- Pt had Mammogram done at [address]: Notice itch on several parts on body on Scalp and Left side of Hip     HPI:  Notes pruritus on scalp, left hip, R shin.  Saw derm and was given a cream whi Laterality Date   •   00   •   02   • COLONOSCOPY  2015    internal hemorrhoids   • FOOT/TOES SURGERY [address]    bunionectomy   • LASER SURGERY OF CERVIX      laser ablation of cervix   • REPAIR OF NASAL cyanosis  Skin - erythematous patch to L hip    ASSESSMENT & PLAN:  Yu Nelson is a 48year old female is here for Physical (WWE no pap- Pt had Mammogram done at [facility] ) and Itchiness (Notice itch on several parts on body on Scalp and Left side of Hip )

## 2019-08-01 LAB
FOLATE, SERUM: 21.6 NG/ML
VITAMIN B12: 711 PG/ML (ref 200–1100)

## 2019-11-04 ENCOUNTER — OFFICE VISIT (OUTPATIENT)
Dept: FAMILY MEDICINE CLINIC | Facility: CLINIC | Age: 54
End: 2019-11-04
Payer: COMMERCIAL

## 2019-11-04 VITALS
HEART RATE: 70 BPM | SYSTOLIC BLOOD PRESSURE: 118 MMHG | TEMPERATURE: 98 F | WEIGHT: 130 LBS | RESPIRATION RATE: 16 BRPM | HEIGHT: 64.5 IN | BODY MASS INDEX: 21.93 KG/M2 | DIASTOLIC BLOOD PRESSURE: 70 MMHG

## 2019-11-04 DIAGNOSIS — F33.41 RECURRENT MAJOR DEPRESSION IN PARTIAL REMISSION (HCC): ICD-10-CM

## 2019-11-04 DIAGNOSIS — G44.211 INTRACTABLE EPISODIC TENSION-TYPE HEADACHE: ICD-10-CM

## 2019-11-04 DIAGNOSIS — N30.01 ACUTE CYSTITIS WITH HEMATURIA: Primary | ICD-10-CM

## 2019-11-04 DIAGNOSIS — R35.0 URINARY FREQUENCY: ICD-10-CM

## 2019-11-04 PROCEDURE — 90686 IIV4 VACC NO PRSV 0.5 ML IM: CPT | Performed by: FAMILY MEDICINE

## 2019-11-04 PROCEDURE — 90471 IMMUNIZATION ADMIN: CPT | Performed by: FAMILY MEDICINE

## 2019-11-04 PROCEDURE — 99214 OFFICE O/P EST MOD 30 MIN: CPT | Performed by: FAMILY MEDICINE

## 2019-11-04 PROCEDURE — 81003 URINALYSIS AUTO W/O SCOPE: CPT | Performed by: FAMILY MEDICINE

## 2019-11-04 RX ORDER — CIPROFLOXACIN 500 MG/1
500 TABLET, FILM COATED ORAL 2 TIMES DAILY
Qty: 6 TABLET | Refills: 0 | Status: SHIPPED | OUTPATIENT
Start: 2019-11-04 | End: 2019-12-27 | Stop reason: ALTCHOICE

## 2019-11-04 RX ORDER — CYCLOBENZAPRINE HCL 5 MG
5 TABLET ORAL 3 TIMES DAILY PRN
Qty: 30 TABLET | Refills: 0 | Status: SHIPPED | OUTPATIENT
Start: 2019-11-04

## 2019-11-04 NOTE — PATIENT INSTRUCTIONS
[facility]   [address] [facility] [facility]  Phone: [phone]

## 2019-11-04 NOTE — PROGRESS NOTES
Chief Complaint:  Patient presents with:  Urinary Frequency: x 3 days, burning with urination and noticed blood  Imm/Inj: Flu Shot    HPI:  This is a 48year old female patient presenting for Urinary Frequency (x 3 days, burning with urination and noticed (emphysema[other]) Mother    • High Cholesterol Mother    • Cancer Mother         thyroid   • Heart Disease Father         stents, angioplasty, dx age 36   • High Cholesterol Father       Social History    Tobacco Use      Smoking status: Never Smoker murmurs, no peripheral edema   ABD: Soft, BSx4, non-tender, non-distended, no guarding or rebound tenderness  EXTREMITIES: warm and well perfused  PSYCH: pleasant and cooperative, no obvious depression or anxiety    ASSESSMENT AND PLAN:  Discussed the foll

## 2019-12-27 ENCOUNTER — OFFICE VISIT (OUTPATIENT)
Dept: FAMILY MEDICINE CLINIC | Facility: CLINIC | Age: 54
End: 2019-12-27
Payer: COMMERCIAL

## 2019-12-27 DIAGNOSIS — J01.40 ACUTE NON-RECURRENT PANSINUSITIS: Primary | ICD-10-CM

## 2019-12-27 PROCEDURE — 99213 OFFICE O/P EST LOW 20 MIN: CPT | Performed by: NURSE PRACTITIONER

## 2019-12-27 RX ORDER — CEFDINIR 300 MG/1
300 CAPSULE ORAL 2 TIMES DAILY
Qty: 20 CAPSULE | Refills: 0 | Status: SHIPPED | OUTPATIENT
Start: 2019-12-27 | End: 2020-01-06

## 2019-12-27 RX ORDER — FLUCONAZOLE 150 MG/1
TABLET ORAL
Qty: 2 TABLET | Refills: 0 | Status: SHIPPED | OUTPATIENT
Start: 2019-12-27

## 2019-12-27 RX ORDER — FLUTICASONE PROPIONATE 50 MCG
2 SPRAY, SUSPENSION (ML) NASAL DAILY
Qty: 1 BOTTLE | Refills: 0 | Status: SHIPPED | OUTPATIENT
Start: 2019-12-27 | End: 2020-01-26

## 2019-12-28 VITALS
HEART RATE: 76 BPM | SYSTOLIC BLOOD PRESSURE: 118 MMHG | DIASTOLIC BLOOD PRESSURE: 70 MMHG | RESPIRATION RATE: 16 BRPM | TEMPERATURE: 99 F

## 2019-12-28 NOTE — PROGRESS NOTES
CHIEF COMPLAINT:   \" Patient presents with:  Sinus Problem    HPI:   Gus Esparza is a 48year old female who presents with a hx of cold sx which progressed to Frontal and Maxillary sinus congestion and pressure worsening over the past 2 weeks.   Pt has Procedure Laterality Date   •   00   •   02   • COLONOSCOPY  2015    internal hemorrhoids   • FOOT/TOES SURGERY [address]    bunionectomy   • LASER SURGERY OF CERVIX      laser ablation of cervix   • REP presents with     1. Acute non-recurrent pansinusitis    Patient requests Diflucan for hx of vaginal yeast infections with Abx. Discussed other preventive strategies. Rx printed for Diflucan. - fluconazole (DIFLUCAN) 150 MG Oral Tab;  Take 1 tablet po wi

## 2020-01-12 DIAGNOSIS — F33.41 RECURRENT MAJOR DEPRESSION IN PARTIAL REMISSION (HCC): ICD-10-CM

## 2020-01-13 RX ORDER — FLUOXETINE HYDROCHLORIDE 20 MG/1
CAPSULE ORAL
Qty: 90 CAPSULE | Refills: 0 | Status: SHIPPED | OUTPATIENT
Start: 2020-01-13 | End: 2020-04-12

## 2020-02-19 ENCOUNTER — TELEPHONE (OUTPATIENT)
Dept: FAMILY MEDICINE CLINIC | Facility: CLINIC | Age: 55
End: 2020-02-19

## 2020-02-19 NOTE — TELEPHONE ENCOUNTER
Spoke to patient regarding scheduling shingrix vaccine.  Pt declined to schedule shingrix for now she stated we can move on to the next patient on the list. Pt is due for physical in July and does not know when she can come in before that for just a nurse v

## 2020-04-12 DIAGNOSIS — F33.41 RECURRENT MAJOR DEPRESSION IN PARTIAL REMISSION (HCC): ICD-10-CM

## 2020-04-12 RX ORDER — FLUOXETINE HYDROCHLORIDE 20 MG/1
CAPSULE ORAL
Qty: 90 CAPSULE | Refills: 0 | Status: SHIPPED | OUTPATIENT
Start: 2020-04-12 | End: 2020-07-11

## 2020-07-06 ENCOUNTER — TELEPHONE (OUTPATIENT)
Dept: FAMILY MEDICINE CLINIC | Facility: CLINIC | Age: 55
End: 2020-07-06

## 2020-07-06 NOTE — TELEPHONE ENCOUNTER
Patient has question about her Fluoxetine 20 mg she believes she needs a stronger dose. Please call.

## 2020-07-06 NOTE — TELEPHONE ENCOUNTER
Patent reports she has been on fluoxetine for many years. She reports she has [place] lot going on\" and has been sleeping a lot and having mood swings over the last couple of months. Denies SI/HI. Depression last assessed at 7/16/2019 visit.  Suggest patient s

## 2020-07-10 DIAGNOSIS — F33.41 RECURRENT MAJOR DEPRESSION IN PARTIAL REMISSION (HCC): ICD-10-CM

## 2020-07-11 RX ORDER — FLUOXETINE HYDROCHLORIDE 20 MG/1
CAPSULE ORAL
Qty: 90 CAPSULE | Refills: 0 | Status: SHIPPED | OUTPATIENT
Start: 2020-07-11 | End: 2020-07-13

## 2020-07-13 NOTE — PROGRESS NOTES
Patient presents with:  Medication Follow-Up: pt would like to talk about medication       HPI:  Presents for follow up depression with several month history of some anxiety as well.  Stated feels her life is a many significant \"forks in the road\" and she Cervicalgia     Genital herpes, unspecified     Irritable bowel syndrome     Mitral valve disorders(424.0)     Recurrent major depression in partial remission (HCC)     Tension type headache     Internal hemorrhoids     Subclinical hyperthyroidism     Vagi routine exercise, adding yoga, meditation, writing in journal, talking to support network (family/friends). Encouraged to continue with professional therapy. See me in 4 weeks for re-eval of medication. Sooner if problems or concerns.  Verbalized understand

## 2020-07-31 ENCOUNTER — TELEPHONE (OUTPATIENT)
Dept: FAMILY MEDICINE CLINIC | Facility: CLINIC | Age: 55
End: 2020-07-31

## 2020-07-31 NOTE — TELEPHONE ENCOUNTER
Pt calling she has a change in her Fluoxitine dosage and is she has had a headache for four days, afternoon is nauseas, and feels like she has to urinate all day long. She doesn't feel like she is relieved when she goes a little bit.

## 2020-07-31 NOTE — TELEPHONE ENCOUNTER
These could actually be related to dose increase. If she feels her mental status is ok should reduce back to normal dose and if symptoms do not improve in next 3-5 days should call office. Iif they worsen over the weekend, should go to UC. Thanks.

## 2020-07-31 NOTE — TELEPHONE ENCOUNTER
Patient has been on fluoxetine for years, but recently increased dose. She's c/o a HA for the last few days. Minimal improvement with Advil. Patient also feels like she constantly needs to urinate. Denies dysuria, no odor, no cloudiness. Afebrile.  No other

## 2020-07-31 NOTE — TELEPHONE ENCOUNTER
Called and went over suggestions from Rafael Keys she said the headache is better today but she wants to try taking 40 mg one day and then 20 mg the next day and see how she does.  Then discuss it at her next appointment

## 2020-08-25 NOTE — PROGRESS NOTES
Patient presents with:  Medication Follow-Up: seems to be working   Swelling: ankles      HPI:  Presents for follow up of anxiety, taking fluoxetine, 20mg one day alternating with 40mg the next. Feels this is working well.  Feels mood is stable and she is m alternating with 40mg every other day 135 capsule 1   • fluconazole (DIFLUCAN) 150 MG Oral Tab Take 1 tablet po with the onset of vaginitis symptoms. May repeat in 3 days if needed.  2 tablet 0   • Cyclobenzaprine HCl 5 MG Oral Tab Take 1 tablet (5 mg tota understanding of instructions and agreeable to this plan of care. Right ankle swelling- unclear etiology. Discussed likely dependent edema in our recently very warm temperatures.  Vasodilatation is also side effect of fluoxetine in up to 5% of patient's

## 2021-03-15 ENCOUNTER — TELEMEDICINE (OUTPATIENT)
Dept: FAMILY MEDICINE CLINIC | Facility: CLINIC | Age: 56
End: 2021-03-15

## 2021-03-15 DIAGNOSIS — Z20.822 EXPOSURE TO COVID-19 VIRUS: ICD-10-CM

## 2021-03-15 DIAGNOSIS — R51.9 GENERALIZED HEADACHE: ICD-10-CM

## 2021-03-15 DIAGNOSIS — R09.81 SINUS CONGESTION: ICD-10-CM

## 2021-03-15 DIAGNOSIS — R05.9 COUGH: Primary | ICD-10-CM

## 2021-03-15 PROCEDURE — 99212 OFFICE O/P EST SF 10 MIN: CPT | Performed by: NURSE PRACTITIONER

## 2021-03-15 NOTE — PATIENT INSTRUCTIONS
Call [phone] to schedule COVID test.     You should purchase a pulse oximeter, available at many local stores and on [address] [facility] [address]. This is a small tool that goes on your finger to check your oxygen levels.  If your levels are less than 91

## 2021-03-15 NOTE — PROGRESS NOTES
Patient presents with:  Headache: daughter tested positve, she tested negative, congested. had a hard time sleeping       This visit was conducted using Telemedicine with live, interactive video and audio.     Patient understands and accepts financial respo acetonide 0.1 % External Cream   1   • Probiotic Product (PROBIOTIC DAILY OR) Take by mouth. • Multiple Vitamins-Minerals (ICAPS LUTEIN & ZEAXANTHIN OR) Take  by mouth. • Valacyclovir HCl (VALTREX) 1 G Oral Tab Take  by mouth.  1 tablet daily     • understanding and is agreeable to this plan of care.     Generalized headache- as above    Sinus congestion- as above    Exposure to covid-19 virus- as above      Orders Placed This Encounter      COVID-19 Testing by PCR (Vity) [E]      Meds & Refills fo

## 2021-03-16 ENCOUNTER — LAB ENCOUNTER (OUTPATIENT)
Dept: LAB | Facility: HOSPITAL | Age: 56
End: 2021-03-16
Attending: NURSE PRACTITIONER
Payer: COMMERCIAL

## 2021-03-16 DIAGNOSIS — R05.9 COUGH: ICD-10-CM

## 2021-03-16 DIAGNOSIS — R51.9 GENERALIZED HEADACHE: ICD-10-CM

## 2021-03-16 DIAGNOSIS — R09.81 SINUS CONGESTION: ICD-10-CM

## 2021-03-16 DIAGNOSIS — Z20.822 EXPOSURE TO COVID-19 VIRUS: ICD-10-CM

## 2021-03-17 LAB — SARS-COV-2 RNA RESP QL NAA+PROBE: DETECTED

## 2021-07-27 RX ORDER — FLUOXETINE HYDROCHLORIDE 20 MG/1
CAPSULE ORAL
Qty: 135 CAPSULE | Refills: 0 | Status: SHIPPED | OUTPATIENT
Start: 2021-07-27 | End: 2022-01-04

## 2021-07-27 NOTE — TELEPHONE ENCOUNTER
Refill request for:    Requested Prescriptions     Pending Prescriptions Disp Refills   • FLUoxetine 20 MG Oral Cap [Pharmacy Med Name: FLUOXETINE HCL CAPS 20MG] 135 capsule 3     Sig: TAKE 1 CAPSULE EVERY OTHER DAY ALTERNATING WITH 2 CAPSULES EVERY OTHER

## 2022-01-04 RX ORDER — FLUOXETINE HYDROCHLORIDE 20 MG/1
CAPSULE ORAL
Qty: 135 CAPSULE | Refills: 0 | Status: SHIPPED | OUTPATIENT
Start: 2022-01-04

## 2022-01-04 NOTE — TELEPHONE ENCOUNTER
Refill provided but patient is overdue for visit, needs IN OFFICE visit for future refills. Please schedule. Thanks.

## 2022-04-29 ENCOUNTER — PATIENT OUTREACH (OUTPATIENT)
Dept: FAMILY MEDICINE CLINIC | Facility: CLINIC | Age: 57
End: 2022-04-29

## (undated) NOTE — ED AVS SNAPSHOT
Yancy Princess   MRN: [id number]    Department:  [facility] Emergency Department   Date of Visit:  1/16/2018           Disclosure     Insurance plans vary and the physician(s) referred by the ER may not be covered by your plan.  Please contact your tell this physician (or your personal doctor if your instructions are to return to your personal doctor) about any new or lasting problems. The primary care or specialist physician will see patients referred from the [facility] Emergency Department.  Kurt Watts

## (undated) NOTE — MR AVS SNAPSHOT
[facility]  [place],  [address] [place] [address] [phone]               Thank you for choosing us for your health care visit with Johan Sal DO.   We are glad to serve you and happy to provide you with this sum ZENCHENT 0.4-35 MG-MCG Tabs   Generic drug:  Norethindrone-Eth Estradiol   Take 1 Tab by mouth daily.                 Where to Get Your Medications      These medications were sent to [facility] include [facility] beds at the [facility] [address]

## (undated) NOTE — Clinical Note
Dear Dr. Jose Eduardo nieves for referring Viviana Barajas to the [facility] in [place]. Bobbi Ford NP

## (undated) NOTE — MR AVS SNAPSHOT
[address] [facility] [address]  [facility],  [address]  [address] [place] [address] [phone]               Thank you for choosing us for your health care visit with Joanell Gaucher, APN.   We are glad to serve you and happy to provide you with this Return in about 1 year (around 5/9/2018) for wellness exam, sooner pending xray-hip pain. Scheduling Instructions     Tuesday May 09, 2017     Imaging:  XR HIP + PELVIS MIN 4 VIEWS RIGHT (CPT=73503)    Instructions:   To schedule an appointment for yo 100/60 mmHg 68 98.7 °F (37.1 °C) (Oral) 64\" 127 lb 21.79 kg/m2         Current Medications          This list is accurate as of: 5/9/17 12:49 PM.  Always use your most recent med list.                CALTRATE 600 PLUS-VIT D OR   Take  by mouth.  1 daily